# Patient Record
Sex: FEMALE | Race: OTHER | NOT HISPANIC OR LATINO | URBAN - METROPOLITAN AREA
[De-identification: names, ages, dates, MRNs, and addresses within clinical notes are randomized per-mention and may not be internally consistent; named-entity substitution may affect disease eponyms.]

---

## 2023-02-23 ENCOUNTER — LAB VISIT (OUTPATIENT)
Dept: LAB | Facility: HOSPITAL | Age: 28
End: 2023-02-23
Attending: FAMILY MEDICINE
Payer: COMMERCIAL

## 2023-02-23 ENCOUNTER — OFFICE VISIT (OUTPATIENT)
Dept: INTERNAL MEDICINE | Facility: CLINIC | Age: 28
End: 2023-02-23
Payer: COMMERCIAL

## 2023-02-23 VITALS
SYSTOLIC BLOOD PRESSURE: 100 MMHG | OXYGEN SATURATION: 98 % | HEIGHT: 59 IN | WEIGHT: 88.38 LBS | BODY MASS INDEX: 17.82 KG/M2 | TEMPERATURE: 98 F | HEART RATE: 96 BPM | DIASTOLIC BLOOD PRESSURE: 78 MMHG

## 2023-02-23 DIAGNOSIS — N94.2 VAGINISMUS: Primary | ICD-10-CM

## 2023-02-23 DIAGNOSIS — R68.82 LOW LIBIDO: ICD-10-CM

## 2023-02-23 DIAGNOSIS — E61.1 IRON DEFICIENCY: ICD-10-CM

## 2023-02-23 DIAGNOSIS — Z79.899 LONG TERM USE OF DRUG: ICD-10-CM

## 2023-02-23 LAB — TESTOST SERPL-MCNC: 37 NG/DL (ref 5–73)

## 2023-02-23 PROCEDURE — 3074F SYST BP LT 130 MM HG: CPT | Mod: CPTII,S$GLB,, | Performed by: FAMILY MEDICINE

## 2023-02-23 PROCEDURE — 36415 COLL VENOUS BLD VENIPUNCTURE: CPT | Performed by: FAMILY MEDICINE

## 2023-02-23 PROCEDURE — 3078F DIAST BP <80 MM HG: CPT | Mod: CPTII,S$GLB,, | Performed by: FAMILY MEDICINE

## 2023-02-23 PROCEDURE — 99204 OFFICE O/P NEW MOD 45 MIN: CPT | Mod: S$GLB,,, | Performed by: FAMILY MEDICINE

## 2023-02-23 PROCEDURE — 84403 ASSAY OF TOTAL TESTOSTERONE: CPT | Performed by: FAMILY MEDICINE

## 2023-02-23 PROCEDURE — 84466 ASSAY OF TRANSFERRIN: CPT | Performed by: FAMILY MEDICINE

## 2023-02-23 PROCEDURE — 3008F BODY MASS INDEX DOCD: CPT | Mod: CPTII,S$GLB,, | Performed by: FAMILY MEDICINE

## 2023-02-23 PROCEDURE — 99999 PR PBB SHADOW E&M-NEW PATIENT-LVL IV: CPT | Mod: PBBFAC,,, | Performed by: FAMILY MEDICINE

## 2023-02-23 PROCEDURE — 80053 COMPREHEN METABOLIC PANEL: CPT | Performed by: FAMILY MEDICINE

## 2023-02-23 PROCEDURE — 84439 ASSAY OF FREE THYROXINE: CPT | Performed by: FAMILY MEDICINE

## 2023-02-23 PROCEDURE — 1159F PR MEDICATION LIST DOCUMENTED IN MEDICAL RECORD: ICD-10-PCS | Mod: CPTII,S$GLB,, | Performed by: FAMILY MEDICINE

## 2023-02-23 PROCEDURE — 3008F PR BODY MASS INDEX (BMI) DOCUMENTED: ICD-10-PCS | Mod: CPTII,S$GLB,, | Performed by: FAMILY MEDICINE

## 2023-02-23 PROCEDURE — 99204 PR OFFICE/OUTPT VISIT, NEW, LEVL IV, 45-59 MIN: ICD-10-PCS | Mod: S$GLB,,, | Performed by: FAMILY MEDICINE

## 2023-02-23 PROCEDURE — 3074F PR MOST RECENT SYSTOLIC BLOOD PRESSURE < 130 MM HG: ICD-10-PCS | Mod: CPTII,S$GLB,, | Performed by: FAMILY MEDICINE

## 2023-02-23 PROCEDURE — 1159F MED LIST DOCD IN RCRD: CPT | Mod: CPTII,S$GLB,, | Performed by: FAMILY MEDICINE

## 2023-02-23 PROCEDURE — 3078F PR MOST RECENT DIASTOLIC BLOOD PRESSURE < 80 MM HG: ICD-10-PCS | Mod: CPTII,S$GLB,, | Performed by: FAMILY MEDICINE

## 2023-02-23 PROCEDURE — 99999 PR PBB SHADOW E&M-NEW PATIENT-LVL IV: ICD-10-PCS | Mod: PBBFAC,,, | Performed by: FAMILY MEDICINE

## 2023-02-23 PROCEDURE — 85025 COMPLETE CBC W/AUTO DIFF WBC: CPT | Performed by: FAMILY MEDICINE

## 2023-02-23 PROCEDURE — 84443 ASSAY THYROID STIM HORMONE: CPT | Performed by: FAMILY MEDICINE

## 2023-02-23 RX ORDER — ULIPRISTAL ACETATE 30 MG/1
TABLET ORAL
COMMUNITY
Start: 2023-02-20

## 2023-02-23 RX ORDER — AZELASTINE 1 MG/ML
1 SPRAY, METERED NASAL
COMMUNITY
Start: 2022-09-22

## 2023-02-23 RX ORDER — FLUTICASONE PROPIONATE 50 MCG
1 SPRAY, SUSPENSION (ML) NASAL
COMMUNITY
Start: 2022-09-22

## 2023-02-23 RX ORDER — FERROUS SULFATE TAB 325 MG (65 MG ELEMENTAL FE) 325 (65 FE) MG
TAB ORAL
COMMUNITY
Start: 2023-01-21

## 2023-02-23 RX ORDER — ACETAMINOPHEN AND CODEINE PHOSPHATE 120; 12 MG/5ML; MG/5ML
1 SOLUTION ORAL
COMMUNITY
Start: 2023-02-19

## 2023-02-23 RX ORDER — MONTELUKAST SODIUM 10 MG/1
10 TABLET ORAL NIGHTLY
COMMUNITY
Start: 2022-09-22

## 2023-02-23 NOTE — PATIENT INSTRUCTIONS
Look into [employer] or online for vaginal dilator set  Mago' Libido --OTC supplement by Jean-Pierre     .  Vulvar Anatomy  1) Looking at the diversity of female anatomy can help you feel more comfortable with your own body  [email or web address]  [email or web address]     2) Once you feel comfortable, you can progress to using a small hand mirror to visualize your own vulva. Try to avoid making any judgements or comparisons, and instead let yourself be a passive observer.      .  Home Exercise Program: 02/23/2023      INTIMACY WITH A PARTNER    KNOW YOU'RE NOT ALONE  3 out of 4 women will experience pain during intercourse at some point in their lives. This pain may be temporary or more longer lasting.     COMMUNICATE WITH YOUR PARTNER  Be open with your partner about your comfort level, personal needs, and limitations with sex.   Make sure that your partner is aware that you may experience pain or discomfort during intercourse so that you'll feel more comfortable pressing pause if necessary.   Communicate which positions do and don't work for your body.     USE A LUBRICANT  If you are prone to dryness, tearing, or tissue irritation, lubricants can help make sex more enjoyable.   Choose a lubricant that is water-based and free of any additives (such as flavoring, heating/cooling properties, scents, glitter) to avoid causing further irritation. Products should be paraben, glycerin, petroleum, and fragrance free, as well as pH balanced.   Talk to your doctor about getting a prescription for topical lidocaine. These creams can be applied to the vulva ahead of time or mixed into lubricant to help decrease pain during intercourse.   Lubricants containing CBD (such as Foria products) may help relax the pelvic floor muscles and decrease pain.  Recommended brands: Slippery Stuff, Good Clean Love, Uberlube, Sliquid    SET THE STAGE  Cultivate an environment that feels safe, relaxing, and inviting. Your mental and emotional state  has a direct effect on your body's sexual function and being relaxed can assist with arousal.   You can do activities such as taking a warm bath, lighting candles, putting on your favorite playlist, or meditating beforehand.     PRACTICE NON-SEXUAL SENSUAL TOUCH  This can be done alone or with a partner.   Using non-scented body lotion or coconut oil, gently massage erogenous zones such as the inner thighs, back, or neck. Avoid touching the genitals and breasts, or initiating oral or penetrative sex. The focus should be on fully feeling the experience of being touched without the expectation of sex occurring afterwards.     BE OPEN TO EXPERIMENTATION  Explore what feels pleasurable to you on your own via masturbation. If you're familiar with what you like, it will be easier to communicate your preferences to your partner.   Try different kinds of stimulation, such as oral sex or tantric massage.   [place] with different positions. If one position feels painful, try another angle or using pillows to support your body. Being on top can also allow you to control the speed and depth of penetration.     TRY A BUFFER  A wearable buffer (such as the Ohnut) can be worn over your partner's penis to control the depth of penetration.   These soft sleeves are designed to feel just like you, so you can feel more comfortable without sacrificing sensation for you or your partner.      MAKE TIME FOR SELF CARE  If you are still experiencing pain during sex, set aside time afterwards for self-care activities such as taking a warm sitz bath, taking an over-the-counter pain reliever, or applying a gel ice pack wrapped in a towel to the vulva.   ..  Home Exercise Program: 02/23/2023    Recommendations for resuming intimacy with pelvic pain  - Talk with your partner about your concerns and needs prior to intimacy. Think about what went well during previous sessions, what you would have preferred differently.  - Try to limit  goal-oriented intimacy. Enjoy your time with each other, and try not to get caught up with prior expectations for intimacy (orgasms, certain positions, etc.)  - Spend plenty of time on foreplay. Think about using a finger to ease into penetration.  - Lubricant, lubricant, lubricant!  - Woman controls speed and depth of penetration, especially initially  - Try positions that limit deep penetration or those that allow for hips-open positioning

## 2023-02-23 NOTE — PROGRESS NOTES
"Subjective:      Patient ID: Tierney Frances is a 27 y.o. female.    Chief Complaint: Establish Care    Mom is [place].     Here to establish care.     Started dating someone last year. Felt confused if she wanted any sex at all.   Still dating same partner.   Has orgasmed in the past with a partner but not from vaginal penetration.   Has significant discomfort with any attempts for vaginal penetration.   No discharge. No irregular cycles.     Has worked with a counselor once in the past but only went once.   Feels some pressure to meet what is "expected"  Living in [place]. Helping unionize food workers.    The patient's Health Maintenance was reviewed and the following appears to be due at this time:   Health Maintenance Due   Topic Date Due    Hepatitis C Screening  Never done    Lipid Panel  Never done    COVID-19 Vaccine (1) Never done    HIV Screening  Never done    TETANUS VACCINE  Never done    Pap Smear  Never done    Influenza Vaccine (1) 09/01/2022        Past Medical History:  History reviewed. No pertinent past medical history.  History reviewed. No pertinent surgical history.  Review of patient's allergies indicates:   Allergen Reactions    Sulfa (sulfonamide antibiotics)     Cefprozil Rash    Penicillins Rash     Social History     Socioeconomic History    Marital status: Single   Tobacco Use    Smoking status: Never     Passive exposure: Never    Smokeless tobacco: Never   Substance and Sexual Activity    Alcohol use: Not Currently    Drug use: Never    Sexual activity: Yes     Partners: Male     Birth control/protection: Other-see comments     Comment: birth control pills     Family History   Problem Relation Age of Onset    Thyroid disease Father     Thyroid disease Brother     Thyroid disease Maternal Grandmother     Thyroid disease Paternal Grandmother        Review of Systems   Constitutional:  Negative for fever.   Respiratory:  Negative for cough and shortness of breath.    Cardiovascular:  " "Negative for chest pain.   Gastrointestinal:  Negative for diarrhea, nausea and vomiting.   Genitourinary:  Positive for dyspareunia.   Psychiatric/Behavioral:  Positive for dysphoric mood. Negative for sleep disturbance. The patient is not nervous/anxious.       Objective:   /78 (BP Location: Left arm, Patient Position: Sitting, BP Method: Large (Manual))   Pulse 96   Temp 98.3 °F (36.8 °C) (Tympanic)   Ht 4' 11" (1.499 m)   Wt 40.1 kg (88 lb 6.5 oz)   LMP  (LMP Unknown)   SpO2 98%   BMI 17.86 kg/m²     PHQ-9 Questionnaire  Little interest or pleasure in doing things: Not at all  Feeling down, depressed, or hopeless: Not at all  Trouble falling or staying asleep, or sleeping too much: Not at all  Feeling tired or having little energy: Several days  Poor appetite or overeating: Not at all  Feeling bad about yourself - or that you are a failure or have let yourself or your family down: Not at all  Trouble concentrating on things, such as reading the newspaper or watching television: Not at all  Moving or speaking so slowly that other people could have noticed? Or the opposite - being so fidgety or restless that you have been moving around a lot more than usual.: Not at all  Thoughts that you would be better off dead or hurting yourself in some way: Not at all  Patient Health Questionnaire-9 Score: 1    How difficult have these problems made it for you to do your work, take care of things at home, or get along with other people?: Not difficult at all    LORE-7 Questionnaire  LORE-7 Questionnaire  Feeling nervous, anxious, or on edge: Several days  Not being able to stop or control worrying: Not at all  Worrying too much about different things: Not at all  Trouble relaxing: Several days  Being so restless that it is hard to sit still: Not at all  Becoming easily annoyed or irritable: Several days  Feeling afraid as if something awful might happen: Not at all  LORE-7 Total Score: 3         Mood Disorder " Questionnaire  Results of the Mood Disorder Questionnaire 2/23/2023   you felt so good or so hyper that other people thought you were not your normal self or you were so hyper that you got into trouble? 0   you were so irritable that you shouted at people or started fights or arguments? 0   you felt much more self-confident than usual? 0   you got much less sleep than usual and found that you didn't really miss it? 0   you were more talkative or spoke much faster than usual? 0   thoughts raced through your head or you couldn't slow your mind down? 0   you were so easily distracted by things around you that you had trouble concentrating or staying on track? 1   you had more energy than usual? 0   you were much more active or did many more things than usual? 0   you were much more social or outgoing than usual, for example, you telephoned friends in the middle of the night? 0   you were much more interested in sex than usual? 0   you did things that were unusual for you or that other people might have thought were excessive, foolish, or risky? 0   spending money got you or your family in trouble? 0   If you checked YES to more than one of the above, have several of these ever happened during the same period of time? 0   How much of a problem did any of these cause you - like being unable to work; having family, money or legal troubles; getting into arguments or fights? No problems   Mood Disorder Questionnaire Score  1      Physical Exam  Vitals and nursing note reviewed.   Constitutional:       Appearance: Normal appearance.   HENT:      Head: Normocephalic.   Eyes:      Conjunctiva/sclera: Conjunctivae normal.   Neck:      Comments: No thyroidmegaly or thyroid nodules  Cardiovascular:      Rate and Rhythm: Normal rate and regular rhythm.   Pulmonary:      Effort: Pulmonary effort is normal.      Breath sounds: Normal breath sounds.   Genitourinary:     Comments: deferred  Musculoskeletal:      Right lower leg: No  edema.      Left lower leg: No edema.   Skin:     General: Skin is warm and dry.      Findings: No rash.   Neurological:      Mental Status: She is alert and oriented to person, place, and time. Mental status is at baseline.   Psychiatric:         Mood and Affect: Mood normal.         Behavior: Behavior normal.         Thought Content: Thought content normal.     Assessment:     1. Vaginismus    2. Iron deficiency    3. Long term use of drug    4. Low libido      Plan:       1. Vaginismus  Comments:  discussed establishing with therapist in [place]; potentially consider trial of vaginal dilators    2. Iron deficiency  Comments:  checking labs, will follow up with results  Orders:  -     Comprehensive Metabolic Panel; Future; Expected date: 02/23/2023  -     CBC Auto Differential; Future; Expected date: 02/23/2023  -     Iron and TIBC; Future; Expected date: 02/23/2023    3. Long term use of drug  -     TSH; Future; Expected date: 02/23/2023  -     T4, Free; Future; Expected date: 02/23/2023    4. Low libido  Comments:  recommended establishing with counselor; reassured pt that sexuality is a spectrum  so she should not feel pressured to society standards  Orders:  -     Testosterone; Future; Expected date: 02/23/2023         Medication List with Changes/Refills   Current Medications    AZELASTINE (ASTELIN) 137 MCG (0.1 %) NASAL SPRAY    1 spray.    WENDY 30 MG TABLET    Take by mouth.    FEROSUL 325 MG (65 MG IRON) TAB TABLET    Take by mouth.    FLUTICASONE PROPIONATE (FLONASE) 50 MCG/ACTUATION NASAL SPRAY    1 spray.    MONTELUKAST (SINGULAIR) 10 MG TABLET    Take 10 mg by mouth every evening.    NORETHINDRONE (MICRONOR) 0.35 MG TABLET    Take 1 tablet by mouth.        40-54 min were used in chart review, evaluation and counseling of patient, documentation and review of results on same day of service.          Follow up in about 3 months (around 5/23/2023) for F/U .

## 2023-02-24 LAB
ALBUMIN SERPL BCP-MCNC: 4.6 G/DL (ref 3.5–5.2)
ALP SERPL-CCNC: 54 U/L (ref 55–135)
ALT SERPL W/O P-5'-P-CCNC: 13 U/L (ref 10–44)
ANION GAP SERPL CALC-SCNC: 10 MMOL/L (ref 8–16)
AST SERPL-CCNC: 20 U/L (ref 10–40)
BASOPHILS # BLD AUTO: 0.02 K/UL (ref 0–0.2)
BASOPHILS NFR BLD: 0.5 % (ref 0–1.9)
BILIRUB SERPL-MCNC: 1.7 MG/DL (ref 0.1–1)
BUN SERPL-MCNC: 10 MG/DL (ref 6–20)
CALCIUM SERPL-MCNC: 10.3 MG/DL (ref 8.7–10.5)
CHLORIDE SERPL-SCNC: 108 MMOL/L (ref 95–110)
CO2 SERPL-SCNC: 22 MMOL/L (ref 23–29)
CREAT SERPL-MCNC: 0.7 MG/DL (ref 0.5–1.4)
DIFFERENTIAL METHOD: ABNORMAL
EOSINOPHIL # BLD AUTO: 0 K/UL (ref 0–0.5)
EOSINOPHIL NFR BLD: 0.5 % (ref 0–8)
ERYTHROCYTE [DISTWIDTH] IN BLOOD BY AUTOMATED COUNT: 12.8 % (ref 11.5–14.5)
EST. GFR  (NO RACE VARIABLE): >60 ML/MIN/1.73 M^2
GLUCOSE SERPL-MCNC: 76 MG/DL (ref 70–110)
HCT VFR BLD AUTO: 45.4 % (ref 37–48.5)
HGB BLD-MCNC: 14.8 G/DL (ref 12–16)
IMM GRANULOCYTES # BLD AUTO: 0.02 K/UL (ref 0–0.04)
IMM GRANULOCYTES NFR BLD AUTO: 0.5 % (ref 0–0.5)
IRON SERPL-MCNC: 122 UG/DL (ref 30–160)
LYMPHOCYTES # BLD AUTO: 1.5 K/UL (ref 1–4.8)
LYMPHOCYTES NFR BLD: 34.6 % (ref 18–48)
MCH RBC QN AUTO: 31.1 PG (ref 27–31)
MCHC RBC AUTO-ENTMCNC: 32.6 G/DL (ref 32–36)
MCV RBC AUTO: 95 FL (ref 82–98)
MONOCYTES # BLD AUTO: 0.3 K/UL (ref 0.3–1)
MONOCYTES NFR BLD: 7.1 % (ref 4–15)
NEUTROPHILS # BLD AUTO: 2.5 K/UL (ref 1.8–7.7)
NEUTROPHILS NFR BLD: 56.8 % (ref 38–73)
NRBC BLD-RTO: 0 /100 WBC
PLATELET # BLD AUTO: 283 K/UL (ref 150–450)
PMV BLD AUTO: 10.2 FL (ref 9.2–12.9)
POTASSIUM SERPL-SCNC: 4.1 MMOL/L (ref 3.5–5.1)
PROT SERPL-MCNC: 7.6 G/DL (ref 6–8.4)
RBC # BLD AUTO: 4.76 M/UL (ref 4–5.4)
SATURATED IRON: 32 % (ref 20–50)
SODIUM SERPL-SCNC: 140 MMOL/L (ref 136–145)
T4 FREE SERPL-MCNC: 0.94 NG/DL (ref 0.71–1.51)
TOTAL IRON BINDING CAPACITY: 379 UG/DL (ref 250–450)
TRANSFERRIN SERPL-MCNC: 256 MG/DL (ref 200–375)
TSH SERPL DL<=0.005 MIU/L-ACNC: 1.15 UIU/ML (ref 0.4–4)
WBC # BLD AUTO: 4.34 K/UL (ref 3.9–12.7)

## 2023-03-07 ENCOUNTER — PATIENT MESSAGE (OUTPATIENT)
Dept: INTERNAL MEDICINE | Facility: CLINIC | Age: 28
End: 2023-03-07
Payer: COMMERCIAL

## 2023-03-07 DIAGNOSIS — E80.6 HYPERBILIRUBINEMIA: Primary | ICD-10-CM

## 2023-03-13 ENCOUNTER — TELEPHONE (OUTPATIENT)
Dept: INTERNAL MEDICINE | Facility: CLINIC | Age: 28
End: 2023-03-13
Payer: COMMERCIAL